# Patient Record
Sex: MALE | Race: WHITE
[De-identification: names, ages, dates, MRNs, and addresses within clinical notes are randomized per-mention and may not be internally consistent; named-entity substitution may affect disease eponyms.]

---

## 2022-05-12 ENCOUNTER — HOSPITAL ENCOUNTER (OUTPATIENT)
Dept: HOSPITAL 61 - KCIC MRI | Age: 27
End: 2022-05-12
Attending: ORTHOPAEDIC SURGERY
Payer: COMMERCIAL

## 2022-05-12 DIAGNOSIS — M25.461: ICD-10-CM
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
## 2022-05-12 DIAGNOSIS — S83.241A: Primary | ICD-10-CM
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
## 2022-05-12 PROCEDURE — 73721 MRI JNT OF LWR EXTRE W/O DYE: CPT

## 2022-05-12 NOTE — [facility]
Examination: MRI of the right knee without contrast



HISTORY: History of right knee pain



COMPARISON: None



TECHNIQUE: Multiplanar, multisequence MR imaging of the right knee without contrast.



FINDINGS:



The anterior cruciate ligament, posterior cruciate ligament appears intact. The medial meniscus, late
ral meniscus appears intact. Mild increased T2 signal/edema identified about the medial collateral li
gament proximal attachment with increased signal in the interstitial fibers approximately likely grad
e 1 sprain.  The lateral collateral ligamentous complex including the fibular collateral ligament, bi
ceps femoris tendon, popliteus tendon appears intact.



The extensor mechanism appears intact.



The medial, lateral retinaculum appears intact. Mild increased T2 signal/edema identified in the [place]
a's fat pad deep to the infrapatellar tendon laterally.



Small knee joint effusion. There is a cortical protuberance measuring 3.1 cm identified in the extend
ing medially from the distal cortex of the femur likely exostosis.



IMPRESSION:



1.  Mild increased T2 signal/edema identified about the medial collateral ligament proximal attachmen
t likely grade 1 sprain or interstitial tear.

2.  Mild increased T2 signal/edema identified in the Hoffa's fat pad deep to the infrapatellar tendon
 laterally could be secondary to impingement.

3.  3.1 cm cortical protuberance identified in the extending medially from the distal cortex of the f
emur likely exostosis.



Electronically signed by: Spencer August MD (5/12/2022 2:43 PM) [id number]